# Patient Record
Sex: FEMALE | Race: WHITE | NOT HISPANIC OR LATINO
[De-identification: names, ages, dates, MRNs, and addresses within clinical notes are randomized per-mention and may not be internally consistent; named-entity substitution may affect disease eponyms.]

---

## 2022-06-16 PROBLEM — Z00.00 ENCOUNTER FOR PREVENTIVE HEALTH EXAMINATION: Status: ACTIVE | Noted: 2022-06-16

## 2022-06-17 ENCOUNTER — APPOINTMENT (OUTPATIENT)
Dept: ORTHOPEDIC SURGERY | Facility: CLINIC | Age: 22
End: 2022-06-17
Payer: COMMERCIAL

## 2022-06-17 ENCOUNTER — TRANSCRIPTION ENCOUNTER (OUTPATIENT)
Age: 22
End: 2022-06-17

## 2022-06-17 VITALS — HEIGHT: 61 IN | WEIGHT: 130 LBS | BODY MASS INDEX: 24.55 KG/M2

## 2022-06-17 DIAGNOSIS — M25.571 PAIN IN RIGHT ANKLE AND JOINTS OF RIGHT FOOT: ICD-10-CM

## 2022-06-17 DIAGNOSIS — Z78.9 OTHER SPECIFIED HEALTH STATUS: ICD-10-CM

## 2022-06-17 PROCEDURE — 99213 OFFICE O/P EST LOW 20 MIN: CPT

## 2022-06-17 PROCEDURE — 73610 X-RAY EXAM OF ANKLE: CPT | Mod: RT

## 2022-06-17 NOTE — PHYSICAL EXAM
[NL (40)] : plantar flexion 40 degrees [NL 30)] : inversion 30 degrees [NL (20)] : eversion 20 degrees [5___] : [facility] 5[unfilled]/5 [2+] : posterior tibialis pulse: 2+ [Normal] : saphenous nerve sensation normal [] : patient ambulates without assistive device [Right] : right ankle [There are no fractures, subluxations or dislocations. No significant abnormalities are seen] : There are no fractures, subluxations or dislocations. No significant abnormalities are seen

## 2022-06-17 NOTE — ASSESSMENT
[FreeTextEntry1] : 23 yo female presenting with right ankle pain. Patient reports that she has polyarthralgias including back, bilateral knees, ankles, right wrist. X-rays negative for fractures/abnormalities\par -Recommended that patient seek evaluation and management from rheumatologist to r/o autoimmune/rheumatologic condition\par -Activities as tolerated\par -Rest, ice, NSAIDs PRN for pain\par -All questions answered\par -F/u PRN\par

## 2022-06-17 NOTE — HISTORY OF PRESENT ILLNESS
[Sudden] : sudden [6] : 6 [0] : 0 [Dull/Aching] : dull/aching [Intermittent] : intermittent [Rest] : rest [Student] : Work status: student [de-identified] : Patient is here for her right ankle. Patient states NKI. Patient states she started to feel pain on 6/13/2022. Patient states she is having lateral ankle pain. Patient reports that her bilateral ankles, right wrist, knees, bilateral shoulders hurt all the time, but reports that her ankles are the most painful. [] : Post Surgical Visit: no [FreeTextEntry1] : Right ankle  [FreeTextEntry3] : 6/13/2022 [FreeTextEntry5] : Patient states NKI [de-identified] : Movement